# Patient Record
(demographics unavailable — no encounter records)

---

## 2024-11-16 NOTE — BEGINNING OF VISIT
[] :  [[employer] ] : provided by [employer]   [Time Spent: ____ minutes] : Total time spent using  services: [unfilled] minutes. The patient's primary language is not English thus required  services. [Mother] : mother [Interpreters_IDNumber] : [id number] [Interpreters_FullName] : Hansa  [TWNoteComboBox1] : Maldivian

## 2024-11-16 NOTE — HISTORY OF PRESENT ILLNESS
[de-identified] : fevers, cough  [FreeTextEntry6] :  21 month old girl presents with cough x 1 week. Few episodes of post tussive emesis. Cough is worsening. No nebulizer treatments given. Fevers x 1 day managed with tylenol.  Drinking her formula but is not eating.

## 2024-11-16 NOTE — DISCUSSION/SUMMARY
[FreeTextEntry1] :  21 month old girl presents with cough and fever.  Well appearing, lungs clear, no signs of bacterial infection.  Likely viral URI.  Given her history will obtain RVP to r/o mycoplasma infection. If positive will start treatment.  Recommend supportive care. Antipyretics. Increase fluid intake, teaspoon of honey before sleep, humidifier in bedroom, elevated head during sleep, steam from shower, and nasal saline followed by nasal suction. Return precautions discussed.

## 2024-11-16 NOTE — BEGINNING OF VISIT
[] :  [[employer] ] : provided by [employer]   [Time Spent: ____ minutes] : Total time spent using  services: [unfilled] minutes. The patient's primary language is not English thus required  services. [Mother] : mother [Interpreters_IDNumber] : [id number] [Interpreters_FullName] : Hansa  [TWNoteComboBox1] : Ugandan

## 2024-11-16 NOTE — PHYSICAL EXAM
[Playful] : playful [Wheezing] : no wheezing [Rales] : no rales [Rhonchi] : no rhonchi [NL] : moves all extremities x4, warm, well perfused x4

## 2024-11-16 NOTE — HISTORY OF PRESENT ILLNESS
[de-identified] : fevers, cough  [FreeTextEntry6] :  21 month old girl presents with cough x 1 week. Few episodes of post tussive emesis. Cough is worsening. No nebulizer treatments given. Fevers x 1 day managed with tylenol.  Drinking her formula but is not eating.

## 2024-11-18 NOTE — PHYSICAL EXAM
[TextEntry] : General: Well-developed, well-nourished, no acute distress, well-appearing, active and playful. Eyes: Extra-ocular movements intact, conjunctiva clear. Head: normocephalic, atraumatic. Ear: Right ear: Normal external ear, non-erythematous tympanic membrane Left ear: Normal external ear, non-erythematous tympanic membrane Nose: Right nose: Nasal cavity is patent, normal nasal mucosa, turbinates are not enlarged Left nose: Nasal cavity is patent, Normal nasal mucosa, turbinates are not enlarged Throat: Oral mucous membranes moist and pink, no oral lesions, normal dentition and gingiva, tonsils 1+ BL, Mallampati class IV. Neck: Supple, trachea midline, no masses. Cardiovascular: Regular rate and rhythm, no murmurs appreciated; capillary refill < 2 second; 2+ radial pulses bilaterally; no edema. Respiratory: No deformities of the chest, symmetric chest rise, breathing non-labored, no retractions, no nasal flaring, no tracheal tug, lungs clear to auscultation BL, good aeration BL through all lung fields, no crackles, no wheezes, no upper airway transmitted sounds. GI: Abdomen soft, nontender, nondistended, normal bowel sounds, no masses, no organomegaly. Lymph: No cervical lymphadenopathy appreciated. Musculoskeletal: Normal muscle tone and strength. Extremities: Warm, well-perfused, no digital clubbing or cyanosis. Skin: Warm, dry, no rashes. Neurology: Awake, alert, and interactive, normal affect, developmentally appropriate.

## 2024-11-18 NOTE — REASON FOR VISIT
[TextEntry] :  Araceli Garcia is 21 months old female with history of prematurity (25 weeks gestation), developmental delay, and retinopathy of prematurity. From pulmonary and sleep perspective, she has history of BPD/chronic lung disease, chronic cough, recurrent wheezing and shortness of breath with the physical activity. The patient is here for an initial evaluation. The history was obtained from the mother via  [id number].

## 2024-11-18 NOTE — HISTORY OF PRESENT ILLNESS
[FreeTextEntry1] :  Araceli has history of chronic cough since 2024 when she was hospitalized with viral bronchiolitis. The cough is dry, is present both during day and night however more pronounced at night and in the early morning. The cough is aggravated with the physical activity and improved with albuterol. The patient also has history of recurrent wheezing associated with viral URI and occasionally with the physical activity. There is also history of out of proportion shortness of breath associated with physical activity.   She has had 2 hospitalizations so far for respiratory related issues. Both hospitalizations were in [facility]. The 1st hospitalization was in 2024. She was hospitalized for 2 weeks. She was diagnosed with HMNV and pneumonia, and she needed intubation and mechanical ventilation. She was also treated with steroids. She was discharged home on as needed albuterol. The 2nd hospitalization was in 2024. She was hospitalized for 10 days. She was HFNC for a week. She was treated with 5 day course of oral steroids. She was discharged home on albuterol as needed.  Other than these hospitalizations, she did not have any urgent care or ER visits. She was only treated with systemic steroids twice in her life during the above hospitalizations. Her current medicines include albuterol as needed. She has never been treated with inhaled steroids. The last albuterol use was a week ago.   There is no history of feeding difficulties including cough or choking with feeding. There is no history of noisy breathing. There is no history of recurrent pneumonias.  ALLERGY SYMPTOMS: There is no history of nasal and ocular allergy symptoms. There is no history of itchy or watery eyes, itchy or watery nose, and chronic nasal congestion. The patient does not use any antiallergic and/or nasal steroids.  SLEEP SYMPTOMS: There is no history of loud snoring, respiratory pauses during sleep, restless sleep, sleep onset and maintenance insomnia. The patient did not have a sleep study.  OTHER SYMPTOMS: There is history of history of prematurity (25 weeks gestation), developmental delay, and retinopathy of prematurity. The patient is feeding, growing and developing well. There is no history of reflux/GERD. There is no history of, FTT, developmental delay, seizure or neurological disorder.  NEONATOLOGY HISTORY: The patient was born at 25 weeks via . The patient's birth weight was 1 lb 4 oz. The pregnancy was complicated by  labor and PROM. The patient was in the NICU for about 4 months. She needs respiratory support initially CPAP followed by the HFNC and then regular NC. She was weaned to RA on 2024 and was discharged home on RA. She was treated with caffeine for apnea of prematurity. She was given one dose of surfactant.   PAST MEDICAL & SURGICAL HISTROY: There is no history of adenotonsillectomy or any other surgery.  FAMILY HISTORY: There is no family history of asthma, eczema and severe allergies. There is no family history of СВЕТЛАНА, RLS and narcolepsy.  SOCIAL HISTORY: The patient lives with the parents and 2 siblings. There is no smoke or pet exposure at home.

## 2024-11-18 NOTE — REVIEW OF SYSTEMS
[TextEntry] :  14-point review of system is negative except mentioned in history of present illness

## 2024-11-18 NOTE — ASSESSMENT
[FreeTextEntry1] : Araceli Garcia is 21 months old female with history of prematurity (25 weeks gestation), developmental delay, and retinopathy of prematurity. From pulmonary and sleep perspective, she has history of BPD/chronic lung disease, chronic cough, recurrent wheezing and shortness of breath with the physical activity.   Araceli's symptoms are consistent with reactive airway disease due to underlying BPD and recurrent viral bronchiolitis. In addition, she may also has underlying airway malacia which could worsen her symptoms during viral respiratory illnesses. Given the severity of her respiratory illnesses, she may benefit from a maintenance therapy.   From sleep perspective, she sleeps well throughout the night. There is no history of snoring, restless sleep, insomnia or parasomnia.     Plan: 1. Start Pulmicort 0.25 mg twice daily 2. Continue albuterol 2.5 mg every 4 hours as needed for cough, wheezing and/or shortness of breath.  3. I also talked to the mother regarding an airway evaluation for underlying anatomical airway abnormalities and we will plan the procedure during spring. The mother understands and agrees with the plan.  4. I will see her in 3 months, sooner if needed.

## 2024-11-18 NOTE — END OF VISIT
[FreeTextEntry3] : All information documented by staff members, review of systems, past medical history, family history, social history, surgical history, medications, allergies, immunizations and vital signs were reviewed. I obtained the history and examined the patient. I reviewed the chart for pertinent history, lab results, radiological images and procedures. The assessment and plan was discussed with the family.  Code selected for this visit is based on time. The total time spent performing E/M services today is 45 minutes. Time includes preparing to see patient, reviewing diagnostic studies and records, direct face-to-face visit, completing orders, medication reconciliation, prescription management, and care coordination. Time spent documenting clinical information in the record is also included. Time reported does not include any separately reported service or time.

## 2025-02-03 NOTE — PHYSICAL EXAM
[TextEntry] : General: Well-developed, well-nourished, no acute distress, well-appearing, active and playful. Eyes: Extra-ocular movements intact, conjunctiva clear. Head: normocephalic, atraumatic. Ear: Right ear: Normal external ear, non-erythematous tympanic membrane Left ear: Normal external ear, non-erythematous tympanic membrane Nose: Right nose: Nasal cavity is patent, normal nasal mucosa, turbinates are not enlarged Left nose: Nasal cavity is patent, Normal nasal mucosa, turbinates are not enlarged Throat: Oral mucous membranes moist and pink, no oral lesions, normal dentition and gingiva, tonsils 1+ BL. Neck: Supple, trachea midline, no masses. Cardiovascular: Regular rate and rhythm, no murmurs appreciated; capillary refill < 2 second; 2+ radial pulses bilaterally; no edema. Respiratory: No deformities of the chest, symmetric chest rise, breathing non-labored, no retractions, no nasal flaring, no tracheal tug, lungs clear to auscultation BL, good aeration BL through all lung fields, no crackles, no wheezes, no upper airway transmitted sounds. GI: Abdomen soft, nontender, nondistended, normal bowel sounds, no masses, no organomegaly. Lymph: No cervical lymphadenopathy appreciated. Musculoskeletal: Normal muscle tone and strength. Extremities: Warm, well-perfused, no digital clubbing or cyanosis. Skin: Warm, dry, no rashes. Neurology: Awake, alert, and interactive, normal affect, developmentally appropriate.

## 2025-02-03 NOTE — HISTORY OF PRESENT ILLNESS
[FreeTextEntry1] : From pulmonary perspective, she has been doing much better since last visit. She was started on inhaled steroids on the last visit, and she has been taking it regularly. There is no history of interval PCP office, urgent care or ER visits or hospitalizations. She has required albuterol a couple of times since last visit.   From sleep perspective, she sleeps well throughout the night. There is no history of snoring, restless sleep, insomnia or parasomnia.  RESPIRATORY HISTORY: Araceli has history of chronic cough and recurrent wheezing since 2024. She has had 2 hospitalizations so far for respiratory related issues. Both hospitalizations were in [facility]. The 1st hospitalization was in 2024. She was hospitalized for 2 weeks. She was diagnosed with HMNV and pneumonia, and she needed intubation and mechanical ventilation. She was also treated with steroids. She was discharged home on as needed albuterol. The 2nd hospitalization was in 2024. She was hospitalized for 10 days. She was HFNC for a week. She was treated with 5 day course of oral steroids. She was discharged home on albuterol as needed. Other than these hospitalizations, she did not have any urgent care or ER visits. She was only treated with systemic steroids twice in her life during the above hospitalizations. There is no history of feeding difficulties including cough or choking with feeding. There is no history of noisy breathing. There is no history of recurrent pneumonias.  ALLERGY SYMPTOMS: There is no history of nasal and ocular allergy symptoms. There is no history of itchy or watery eyes, itchy or watery nose, and chronic nasal congestion. The patient does not use any antiallergic and/or nasal steroids.  SLEEP SYMPTOMS: There is no history of loud snoring, respiratory pauses during sleep, restless sleep, sleep onset and maintenance insomnia. The patient did not have a sleep study.  OTHER SYMPTOMS: There is history of history of prematurity (25 weeks gestation), developmental delay, and retinopathy of prematurity. The patient is feeding, growing and developing well. There is no history of reflux/GERD. There is no history of, FTT, developmental delay, seizure or neurological disorder.  NEONATOLOGY HISTORY: The patient was born at 25 weeks via . The patient's birth weight was 1 lb 4 oz. The pregnancy was complicated by  labor and PROM. The patient was in the NICU for about 4 months. She needs respiratory support initially CPAP followed by the HFNC and then regular NC. She was weaned to RA on 2024 and was discharged home on RA. She was treated with caffeine for apnea of prematurity. She was given one dose of surfactant.  PAST MEDICAL & SURGICAL HISTROY: There is no history of adenotonsillectomy or any other surgery.  FAMILY HISTORY: There is no family history of asthma, eczema and severe allergies. There is no family history of СВЕТЛАНА, RLS and narcolepsy.  SOCIAL HISTORY: The patient lives with the parents and 2 siblings. There is no smoke or pet exposure at home.

## 2025-02-03 NOTE — END OF VISIT
[FreeTextEntry3] : All information documented by staff members, review of systems, past medical history, family history, social history, surgical history, medications, allergies, immunizations and vital signs were reviewed. I obtained the history and examined the patient. I reviewed the chart for pertinent history, lab results, radiological images and procedures. The assessment and plan was discussed with the family.  Code selected for this visit is based on time. The total time spent performing E/M services today is 30 minutes. Time includes preparing to see patient, reviewing diagnostic studies and records, direct face-to-face visit, completing orders, medication reconciliation, prescription management, and care coordination. Time spent documenting clinical information in the record is also included. Time reported does not include any separately reported service or time.

## 2025-02-03 NOTE — RESULTS/DATA
[TextEntry] : Chest x-ray 02/06/2024: Cardiothymic silhouette is within normal limits. The lungs are mildly hyperinflated with bilateral perihilar reticular opacities. There is no pleural effusion or definite pneumothorax. The visualized osseous structures and upper abdomen are within normal limits.  Chest x-ray 04/07/2023: Single AP view of the chest and abdomen demonstrates enteric tube tip in the region of the stomach. The cardiothymic silhouette is magnified but grossly within normal limits. Prominent bronchovascular markings are seen. There is no evidence of focal consolidation pneumothorax or pleural effusion. Multiple mildly dilated loops of bowel are noted throughout the abdomen. Air is not seen within the rectum on this study.  Chest x-ray 01/31/2023: The heart is normal in size. Bilateral granular opacities, unchanged. No pneumothorax or pleural effusion.  Chest x-ray 01/30/2023: Diffuse bilateral granular opacities. No pleural effusion or pneumothorax.

## 2025-02-03 NOTE — REASON FOR VISIT
[TextEntry] : Araceli Garcia is 2 years old female with history of prematurity (25 weeks gestation), developmental delay, and retinopathy of prematurity. From pulmonary and sleep perspective, she has history of BPD/chronic lung disease, chronic cough, and moderate persistent asthma. The patient is here for a routine follow up and the last clinic visit was on 11/18/2024. The history was obtained from the patient and mother.

## 2025-02-03 NOTE — ASSESSMENT
[FreeTextEntry1] : Araceli Garcia is 2 years old female with history of prematurity (25 weeks gestation), developmental delay, and retinopathy of prematurity. From pulmonary and sleep perspective, she has history of BPD/chronic lung disease, chronic cough, and moderate persistent asthma.  From pulmonary perspective, she has been doing much better since last visit. She was started on inhaled steroids on the last visit, and she has been taking it regularly. There is no history of interval PCP office, urgent care or ER visits or hospitalizations. She has required albuterol a couple of times since last visit.   From sleep perspective, she sleeps well throughout the night. There is no history of snoring, restless sleep, insomnia or parasomnia.  Plan: 1. Continue Pulmicort 0.25 mg twice daily 2. Continue albuterol 2.5 mg every 4 hours as needed for cough, wheezing and/or shortness of breath. 3. I will see her in 3 months, sooner if needed. If she is doing well at that time, I will step down her Pulmicort.

## 2025-02-07 NOTE — PHYSICAL EXAM
[Alert] : alert [No Acute Distress] : no acute distress [Normocephalic] : normocephalic [Anterior [place] Closed] : anterior fontanelle closed [Red Reflex Bilateral] : red reflex bilateral [PERRL] : PERRL [Normally Placed Ears] : normally placed ears [Auricles Well Formed] : auricles well formed [Clear Tympanic membranes with present light reflex and bony landmarks] : clear tympanic membranes with present light reflex and bony landmarks [No Discharge] : no discharge [Nares Patent] : nares patent [Palate Intact] : palate intact [Uvula Midline] : uvula midline [Tooth Eruption] : tooth eruption  [Supple, full passive range of motion] : supple, full passive range of motion [No Palpable Masses] : no palpable masses [Symmetric Chest Rise] : symmetric chest rise [Clear to Auscultation Bilaterally] : clear to auscultation bilaterally [Regular Rate and Rhythm] : regular rate and rhythm [S1, S2 present] : S1, S2 present [No Murmurs] : no murmurs [+2 Femoral Pulses] : +2 femoral pulses [Soft] : soft [NonTender] : non tender [Non Distended] : non distended [Normoactive Bowel Sounds] : normoactive bowel sounds [No Hepatomegaly] : no hepatomegaly [No Splenomegaly] : no splenomegaly [Juan 1] : Juan 1 [Normal Vaginal Introitus] : normal vaginal introitus [Patent] : patent [Normally Placed] : normally placed [No Abnormal Lymph Nodes Palpated] : no abnormal lymph nodes palpated [No Clavicular Crepitus] : no clavicular crepitus [Symmetric Buttocks Creases] : symmetric buttocks creases [No Spinal Dimple] : no spinal dimple [NoTuft of Hair] : no tuft of hair [Cranial Nerves Grossly Intact] : cranial nerves grossly intact [No Rash or Lesions] : no rash or lesions [FreeTextEntry6] : possible mild clitoromegaly

## 2025-02-07 NOTE — HISTORY OF PRESENT ILLNESS
[Mother] : mother [Cow's milk (Ounces per day ___)] : consumes [unfilled] oz of Cow's milk per day [Fruit] : fruit [Vegetables] : vegetables [Meat] : meat [Cereal] : cereal [Eggs] : eggs [Baby food] : baby food [Finger Foods] : finger foods [Table food] : table food [Normal] : Normal [Sippy cup use] : Sippy cup use [Brushing teeth] : Brushing teeth [Vitamin] : Primary Fluoride Source: Vitamin [Playtime 60 min a day] : Playtime 60 min a day [No] : Not at  exposure [Car seat in back seat] : Car seat in back seat [Carbon Monoxide Detectors] : Carbon monoxide detectors [At risk for exposure to TB] : Not at risk for exposure to Tuberculosis [Up to date] : Up to date [FreeTextEntry7] : 2 year old well visit Saw pulmonology, now started on budesonide BID with albuterol PRN. Per mom, Araceli is needing the albuterol only with a cold. No nighttime cough or wheezing. No ED visits since starting medication.   [de-identified] : None

## 2025-02-07 NOTE — DISCUSSION/SUMMARY
[Assessment of Language Development] : assessment of language development [Temperament and Behavior] : temperament and behavior [Toilet Training] : toilet training [TV Viewing] : tv viewing [Safety] : safety [] : The components of the vaccine(s) to be administered today are listed in the plan of care. The disease(s) for which the vaccine(s) are intended to prevent and the risks have been discussed with the caretaker.  The risks are also included in the appropriate vaccination information statements which have been provided to the patient's caregiver.  The caregiver has given consent to vaccinate. [FreeTextEntry1] :  - Growing appropriately - Speech delay - mom unsure how many words kristie is speaking. Discussed monitoring and keeping a list over the next 3 mo and then returning for development check. Refer to EI if warranted  - Moderate persistent asthma - Taking budesonide BID with albuterol PRN. Well controlled at this time. Following with pulmonology - Hgb and lead ordered - Vaccines: hepA and flu  - Lead screening negative - Vision normal - Referred to dentist  - MVIF prescribed  - Well visit in 6 mo

## 2025-02-07 NOTE — DEVELOPMENTAL MILESTONES
[Plays alongside other children] : plays alongside other children [Takes off some clothing] : takes off some clothing [Scoops well with spoon] : scoops well with spoon [Uses 50 words] : does not use 50 words [Combine 2 words into phrase or] : does not combine 2 words into phrase or sentences [Jumps off ground with 2 feet] : jumps off ground with 2 feet [Runs with coordination] : runs with coordination [Turns book pages] : turns book pages [Uses hands to turn objects] : uses hands to turn objects

## 2025-02-07 NOTE — HISTORY OF PRESENT ILLNESS
[Mother] : mother [Cow's milk (Ounces per day ___)] : consumes [unfilled] oz of Cow's milk per day [Fruit] : fruit [Vegetables] : vegetables [Meat] : meat [Cereal] : cereal [Eggs] : eggs [Baby food] : baby food [Finger Foods] : finger foods [Table food] : table food [Normal] : Normal [Sippy cup use] : Sippy cup use [Brushing teeth] : Brushing teeth [Vitamin] : Primary Fluoride Source: Vitamin [Playtime 60 min a day] : Playtime 60 min a day [No] : Not at  exposure [Car seat in back seat] : Car seat in back seat [Carbon Monoxide Detectors] : Carbon monoxide detectors [At risk for exposure to TB] : Not at risk for exposure to Tuberculosis [Up to date] : Up to date [FreeTextEntry7] : 2 year old well visit Saw pulmonology, now started on budesonide BID with albuterol PRN. Per mom, Araceli is needing the albuterol only with a cold. No nighttime cough or wheezing. No ED visits since starting medication.   [de-identified] : None

## 2025-02-07 NOTE — PHYSICAL EXAM
[Alert] : alert [No Acute Distress] : no acute distress [Normocephalic] : normocephalic [Anterior [place] Closed] : anterior fontanelle closed [Red Reflex Bilateral] : red reflex bilateral [PERRL] : PERRL [Normally Placed Ears] : normally placed ears [Auricles Well Formed] : auricles well formed [Clear Tympanic membranes with present light reflex and bony landmarks] : clear tympanic membranes with present light reflex and bony landmarks [No Discharge] : no discharge [Nares Patent] : nares patent [Palate Intact] : palate intact [Uvula Midline] : uvula midline [Tooth Eruption] : tooth eruption  [Supple, full passive range of motion] : supple, full passive range of motion [No Palpable Masses] : no palpable masses [Symmetric Chest Rise] : symmetric chest rise [Clear to Auscultation Bilaterally] : clear to auscultation bilaterally [Regular Rate and Rhythm] : regular rate and rhythm [S1, S2 present] : S1, S2 present [No Murmurs] : no murmurs [+2 Femoral Pulses] : +2 femoral pulses [Soft] : soft [NonTender] : non tender [Non Distended] : non distended [Normoactive Bowel Sounds] : normoactive bowel sounds [No Hepatomegaly] : no hepatomegaly [No Splenomegaly] : no splenomegaly [Juan 1] : Juan 1 [Normal Vaginal Introitus] : normal vaginal introitus [Patent] : patent [Normally Placed] : normally placed [No Abnormal Lymph Nodes Palpated] : no abnormal lymph nodes palpated [No Clavicular Crepitus] : no clavicular crepitus [Symmetric Buttocks Creases] : symmetric buttocks creases [No Spinal Dimple] : no spinal dimple [NoTuft of Hair] : no tuft of hair [Cranial Nerves Grossly Intact] : cranial nerves grossly intact [No Rash or Lesions] : no rash or lesions [FreeTextEntry6] : possible mild clitoromegaly

## 2025-05-05 NOTE — REASON FOR VISIT
[TextEntry] : Araceli Garcia is 2 years old female with history of prematurity (25 weeks gestation), developmental delay, and retinopathy of prematurity. From pulmonary and sleep perspective, she has history of BPD/chronic lung disease, and moderate persistent asthma. The patient is here for a routine follow up and the last clinic visit was on 02/03/2025. The history was obtained from the patient and mother.

## 2025-05-05 NOTE — HISTORY OF PRESENT ILLNESS
[FreeTextEntry1] : From pulmonary perspective, she has been doing much better since last visit. She was taking Budesonide until 6 weeks ago. It was gradually weaned and discontinued. Since last visit she has needed albuterol 3 times. She has needed albuterol once since budesonide was discontinued and was 2 weeks ago when she discontinued wheezing after she was playing outside. Of note, albuterol causes tachycardia to 190 and 200.   From sleep perspective, she sleeps well throughout the night. There is no history of snoring, restless sleep, insomnia or parasomnia.  RESPIRATORY HISTORY: Araceli has history of chronic cough and recurrent wheezing since 2024. She has had 2 hospitalizations so far for respiratory related issues. Both hospitalizations were in [facility]. The 1st hospitalization was in 2024. She was hospitalized for 2 weeks. She was diagnosed with HMNV and pneumonia, and she needed intubation and mechanical ventilation. She was also treated with steroids. She was discharged home on as needed albuterol. The 2nd hospitalization was in 2024. She was hospitalized for 10 days. She was HFNC for a week. She was treated with 5 day course of oral steroids. She was discharged home on albuterol as needed. Other than these hospitalizations, she did not have any urgent care or ER visits. She was only treated with systemic steroids twice in her life during the above hospitalizations. There is no history of feeding difficulties including cough or choking with feeding. There is no history of noisy breathing. There is no history of recurrent pneumonias.  ALLERGY SYMPTOMS: There is no history of nasal and ocular allergy symptoms. There is no history of itchy or watery eyes, itchy or watery nose, and chronic nasal congestion. The patient does not use any antiallergic and/or nasal steroids.  SLEEP SYMPTOMS: There is no history of loud snoring, respiratory pauses during sleep, restless sleep, sleep onset and maintenance insomnia. The patient did not have a sleep study.  OTHER SYMPTOMS: There is history of history of prematurity (25 weeks gestation), developmental delay, and retinopathy of prematurity. The patient is feeding, growing and developing well. There is no history of reflux/GERD. There is no history of, FTT, developmental delay, seizure or neurological disorder.  NEONATOLOGY HISTORY: The patient was born at 25 weeks via . The patient's birth weight was 1 lb 4 oz. The pregnancy was complicated by  labor and PROM. The patient was in the NICU for about 4 months. She needs respiratory support initially CPAP followed by the HFNC and then regular NC. She was weaned to RA on 2024 and was discharged home on RA. She was treated with caffeine for apnea of prematurity. She was given one dose of surfactant.  PAST MEDICAL & SURGICAL HISTROY: There is no history of adenotonsillectomy or any other surgery.  FAMILY HISTORY: There is no family history of asthma, eczema and severe allergies. There is no family history of СВЕТЛАНА, RLS and narcolepsy.  SOCIAL HISTORY: The patient lives with the parents and 2 siblings. There is no smoke or pet exposure at home.

## 2025-05-05 NOTE — ASSESSMENT
[FreeTextEntry1] : Araceli Garcia is 2 years old female with history of prematurity (25 weeks gestation), developmental delay, and retinopathy of prematurity. From pulmonary and sleep perspective, she has history of BPD/chronic lung disease, and moderate persistent asthma.  From pulmonary perspective, she has been doing much better since last visit. She was taking Budesonide until 6 weeks ago. It was gradually weaned and discontinued. Since last visit she has needed albuterol 3 times. She has needed albuterol once since budesonide was discontinued and was 2 weeks ago when she discontinued wheezing after she was playing outside. Of note, albuterol causes tachycardia to 190 and 200.   From sleep perspective, she sleeps well throughout the night. There is no history of snoring, restless sleep, insomnia or parasomnia.  Plan: 1. Since she is doing better, do not restart Budesonide.  2. Discontinue albuterol because of significant tachycardia.  3. Start Xoponex every 4 hours as needed for cough, wheezing and/or shortness of breath. 4. I will see her as needed.

## 2025-05-21 NOTE — DISCUSSION/SUMMARY
[FreeTextEntry1] : meeting developmental milestones No signs of AOM or PNA Well appearing over all  Return for new concerns or persistent fevers Well visit at 30 months of age

## 2025-05-21 NOTE — HISTORY OF PRESENT ILLNESS
[de-identified] : fever follow up  [FreeTextEntry6] : here for recheck development - speech and fever per mom Araceli is now speaking many more words >30 words and she is putting 2 words together in Pashto  She is walking around, is able to go up the stairs Is pointing and responding to name  She follows tasks  She had a fever for a few days last week but now fever has resolved she had a slight cough that is also resolved

## 2025-05-21 NOTE — HISTORY OF PRESENT ILLNESS
[de-identified] : fever follow up  [FreeTextEntry6] : here for recheck development - speech and fever per mom Araceli is now speaking many more words >30 words and she is putting 2 words together in Azeri  She is walking around, is able to go up the stairs Is pointing and responding to name  She follows tasks  She had a fever for a few days last week but now fever has resolved she had a slight cough that is also resolved

## 2025-05-27 NOTE — HISTORY OF PRESENT ILLNESS
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

## 2025-05-27 NOTE — CONSULT LETTER
[Dear  ___] : Dear  [unfilled], [Consult Letter:] : I had the pleasure of evaluating your patient, [unfilled]. [Please see my note below.] : Please see my note below. [Consult Closing:] : Thank you very much for allowing me to participate in the care of this patient.  If you have any questions, please do not hesitate to contact me. [Sincerely,] : Sincerely, [FreeTextEntry3] : Nadege Last MD

## 2025-06-02 NOTE — HISTORY OF PRESENT ILLNESS
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   Araceli's mother reports that she was last seen by her pediatrician this past May for a routine physical examination but had been referred to endocrinology previously. 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 Her mother denies noting axillary hair or odor, pubic hair, or breast development. She was born at 25 weeks and was in the NICU for 4 months, only needed nasal canula O2, and feeding/growing; she was then hospitalized to the PICU at the age of 1 year for a virus and was intubated at that time.   On review of growth points her growth in height has been overall steady

## 2025-06-02 NOTE — PHYSICAL EXAM
[1] : was Juan stage 1 [Juan Stage ___] : the Juan stage for breast development was [unfilled] [Murmur] : no murmurs [FreeTextEntry2] : clitoral wilcox is enlarged, clitoris ~0.5-1 cm

## 2025-06-02 NOTE — PAST MEDICAL HISTORY
[At ___ Weeks Gestation] : at [unfilled] weeks gestation [Age Appropriate] : age appropriate developmental milestones met [de-identified] : NICU for 4 months mostly feeding and growing, needed nasal canula O2 only.

## 2025-06-20 NOTE — HISTORY OF PRESENT ILLNESS
[Gestational Age: ___] : Gestational Age: [unfilled] [Chronological Age: ___] : Chronological Age: [unfilled] [Corrected Age: ___] : Corrected Age: [unfilled] [Pulmonology: ___] : Pulmonology: [unfilled] [Developmental Pediatrics: ___] : Developmental Pediatrics: [unfilled] [No Feeding Issues] : no feeding issues at this time [Ophthalmology: ___] : Ophthalmology: [unfilled] [Solid Foods] : eating solid foods [Soft] : soft [Bloody] : not bloody [Mucousy] : no mucous [de-identified] : Gestational Age: 25, Chronological Age: 28 months 3 weeks  and Corrected Age: 25 months 1 week She has been well recently   [de-identified] : Developmental History: NRE: 10;  High risk & Developmental follow up.  Patient had been scheduled with Peds Behavior and Development on 23.  The patient canceled and no follow up was made.  Will reach out to make appointment for follow up. She was  evaluated by EI and did not qualify for services last year   [de-identified] : she had a cough last week which is now resolved. 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

## 2025-06-20 NOTE — BIRTH HISTORY
[Birthweight ___ kg] : weight [unfilled] kg [Weight ___ kg] : weight [unfilled] kg [Length ___ cm] : length [unfilled] cm [Head Circumference ___ cm] : head circumference [unfilled] cm [EHM: ___] : EHM: [unfilled] [Formula: ____] : formula: [unfilled]

## 2025-06-20 NOTE — DISCUSSION/SUMMARY
[FreeTextEntry1] :   Pt was seen in our NICU [facility] clinic today by Ange Fitzgerald OTR/L, Dr. Montoya, and myself, Maryann Joshi PT, DPT, PCS, CNT. Pt presents to clinic with her mother, father, and brother present. Pt presents as a happy and alert boy/girl, who was interactive during the evaluation. Parents report this behavior was typical. Assessment was performed in her native language of Cymraes.   PMH: ex 25 weeker  At home, she does not receive any services. She is home with Mom during the day.   Pt was evaluated today in clinic using the Neftali Scales of Infant and Toddler Development, Fourth Edition (BSID-IV).  Ange Fitzgerald and I performed the full assessment in [place]ym[place].   Pt scored very low/low avg/avg/ above avg in the cognitive section. Her raw score was x.  Her scaled score was x. The age equivalent was x months. She scored in the x percentile.   Pt scored very low/low avg/avg/ above avg in the language section. She scored in the x percentile. Receptive communication: raw score was x.  Scaled score was x, with an age equivalent of x months. Expressive communication:  raw score was x.  Scaled score was x. with an age equivalent of x months.   Pt scored very low/low avg/avg/ above avg in the motor section. She scored in the x percentile. Fine motor: raw score was x.  Scaled score was x, with an age equivalent of x months. Gross motor:  raw score was x.  Scaled score was x, with an age equivalent of x months.     At this time, we are recommending _________________.  Parents were educated on activities to perform in the home such as reading books, encouraging fine and gross motor activities, and singing. They reported good understanding of all.

## 2025-06-20 NOTE — DISCUSSION/SUMMARY
[FreeTextEntry1] :   Pt was seen in our NICU [facility] clinic today by Ange Fitzgerald OTR/L, Dr. Montoya, and myself, Maryann Joshi PT, DPT, PCS, CNT. Pt presents to clinic with her mother, father, and brother present. Pt presents as a happy and alert boy/girl, who was interactive during the evaluation. Parents report this behavior was typical. Assessment was performed in her native language of Nigerien.   PMH: ex 25 weeker  At home, she does not receive any services. She is home with Mom during the day.   Pt was evaluated today in clinic using the Neftali Scales of Infant and Toddler Development, Fourth Edition (BSID-IV).  Ange Fitzgerald and I performed the full assessment in Nigerien.   Pt scored very low/low avg/avg/ above avg in the cognitive section. Her raw score was x.  Her scaled score was x. The age equivalent was x months. She scored in the x percentile.   Pt scored very low/low avg/avg/ above avg in the language section. She scored in the x percentile. Receptive communication: raw score was x.  Scaled score was x, with an age equivalent of x months. Expressive communication:  raw score was x.  Scaled score was x. with an age equivalent of x months.   Pt scored very low/low avg/avg/ above avg in the motor section. She scored in the x percentile. Fine motor: raw score was x.  Scaled score was x, with an age equivalent of x months. Gross motor:  raw score was x.  Scaled score was x, with an age equivalent of x months.     At this time, we are recommending _________________.  Parents were educated on activities to perform in the home such as reading books, encouraging fine and gross motor activities, and singing. They reported good understanding of all.

## 2025-06-20 NOTE — REASON FOR VISIT
[Follow-Up] : a follow-up visit for [Developmental Delay] : developmental delay [Parents] : parents [Language Line ] : provided by Language [facility]   [Time Spent: ____ minutes] : Total time spent using  services: [unfilled] minutes. The patient's primary language is not English thus required  services. [FreeTextEntry3] : Neftali testing [Interpreters_IDNumber] : [id number] [Interpreters_FullName] : hung [TWNoteComboBox1] : American

## 2025-06-20 NOTE — PHYSICAL EXAM
[Pink] : pink [Well Perfused] : well perfused [No Rashes] : no rashes [No Birth Marks] : no birth marks [Conjunctiva Clear] : conjunctiva clear [PERRL] : pupils were equal, round, reactive to light  [Ears Normal Position and Shape] : normal position and shape of ears [Nares Patent] : nares patent [No Nasal Flaring] : no nasal flaring [Moist and Pink Mucous Membranes] : moist and pink mucous membranes [Palate Intact] : palate intact [No Torticollis] : no torticollis [No Neck Masses] : no neck masses [Symmetric Expansion] : symmetric chest expansion [No Retractions] : no retractions [Clear to Auscultation] : lungs clear to auscultation  [Normal S1, S2] : normal S1 and S2 [Regular Rhythm] : regular rhythm [No Murmur] : no mumur [Normal Pulses] : normal pulses [Non Distended] : non distended [No Masses] : no masses were palpated [Normal Bowel Sounds] : normal bowel sounds [No Scoliosis] : no scoliosis [Normal Range of Motion] : normal range of motion [Normal Posture] : normal posture [No evidence of Hip Dislocation] : no evidence of hip dislocation [Active and Alert] : active and alert [Normal muscle tone] : normal muscle tone of all extremites [Normal truncal tone] : normal truncal tone [Normal deep tendon reflexes] : normal deep tendon reflexes [No head lag] : no head lag [Laughs] : laughs [Mature Pincer Grasp] : has a mature pincer grasp [de-identified] :  repeats words and phrases  [de-identified] : walks well without support

## 2025-06-20 NOTE — ASSESSMENT
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   Infant was seen today in the [facility] with PT for BSID-IV.   Developmental testing performed at this visit:   Neftali Scales of Infant and Toddler Development, Fourth Edition (BSID-IV)   The BSID-IV is used to assess the motor (fine and gross), language (receptive and expressive), and cognitive development of infants and toddlers. Scores were NOT corrected for prematurity   Cognitive Raw Score = 96, Scaled Score = 5 ( age equivalent 20 months)   Fine Motor Raw Score = 69, Scaled Score = 12 (age equivalent  33 months)   Gross Motor Raw Score = 86 , Scaled Score = 6 ( age equivalent 19 months)  Receptive Language Score =38 , Scaled Score = 3 ( age equivalent 18 months)   Expressive Language Score = 35 , Scaled Score = 4 ( age equivalent 18 months)    Cognitive Sum of Scaled Scores=5, Standard Score= ____75___, % ile rank = 5   Motor Sum of Scaled Scores=18, Standard Score= 94, % ile rank = 34   Language Sum of Scaled Scores=7, Standard Score= 66, % ile rank = 1    Assessment    Infant was seen today in the [facility] with PT for BSID-IV.   Developmental Assessment: Araceli is scoring  very low average for  cognitive skills, extremely low for language skills, and average for motor skills for chronologic age.  These results were reviewed with the parents today.  She has had an assessment by EI in 2024 and did not qualify for services at that time.  The household is primarily Grenadian Speaking and although the assessment today was performed in Grenadian by a Grenadian speaking provider, language differences may have contributed to today's language and cognitive skills results.   Her older sibling has  ASD and receives CPSE services at home.  We recommend that Araceli be re-evaluated by Early Intervention given the dealys in both language and cognitive domains. Her delays in gross motor may also be due to her not understanding the instructions for the task.  Her performance is somewhat scattered in all domains.  We provided the information for [facility] and the mother is amenable to calling for an eval. We also provided a list of activities in Grenadian.      We do recommend that they follow up with the Pediatric Behavior and Development Team in the next 3 months.  We explained the importance of this visit in addition to following with the [facility] to follow-up on the services needed. It is possible that EI may just transition to CPSE given that Araceli is already 28 months old.   Home exercises/activities to support development were discussed including the importance of reading to your child.  Nutrition:  Araceli is growing well.  She  is at the ___48___ % for weight, _____26_____% for length. She eats well per mother. They should continue with their current feeding plan.  Appropriate developmental support for feeding was discussed with the family today.   Medical : Araceli is following with the following medical specialties  Peds Pulmonology , endocrine and  Peds Behavior/Developmental and Ophthalmology  Pulmonary: She has been diagnosed with asthma, and has recently been weaned off budesonide and is also now using Xopenex PRN. Pulm f/u is PRN as well.   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 I provided the location for [facility] to have the blood work done. Endo will f/u results . Optho:  last seen:   approx 1 year ago with the recommendation to f/u in 1 year. mother has not been able to make appt due to language barrier. I will call the office ( Dr Scott in [place]) to facilitate)   Follow Up: Follow up with Peds Ophthalmology. Follow up with Behavior and Development   Follow up at [facility] clinic in 1 year for repeat Neftali testing       Time Based Billing: I have spent  95 minutes of time on the encounter, 35 minutes spent on visit with coordination of care, review of patient's chart on the day of the visit, examining the patient and counseling the family.  An additional 60 minutes spent on standardized and structured developmental screening in conjunction with therapy services (scoring, interpretation, report-writing, and review with the family).

## 2025-06-20 NOTE — PHYSICAL EXAM
[Pink] : pink [Well Perfused] : well perfused [No Rashes] : no rashes [No Birth Marks] : no birth marks [Conjunctiva Clear] : conjunctiva clear [PERRL] : pupils were equal, round, reactive to light  [Ears Normal Position and Shape] : normal position and shape of ears [Nares Patent] : nares patent [No Nasal Flaring] : no nasal flaring [Moist and Pink Mucous Membranes] : moist and pink mucous membranes [Palate Intact] : palate intact [No Torticollis] : no torticollis [No Neck Masses] : no neck masses [Symmetric Expansion] : symmetric chest expansion [No Retractions] : no retractions [Clear to Auscultation] : lungs clear to auscultation  [Normal S1, S2] : normal S1 and S2 [Regular Rhythm] : regular rhythm [No Murmur] : no mumur [Normal Pulses] : normal pulses [Non Distended] : non distended [No Masses] : no masses were palpated [Normal Bowel Sounds] : normal bowel sounds [No Scoliosis] : no scoliosis [Normal Range of Motion] : normal range of motion [Normal Posture] : normal posture [No evidence of Hip Dislocation] : no evidence of hip dislocation [Active and Alert] : active and alert [Normal muscle tone] : normal muscle tone of all extremites [Normal truncal tone] : normal truncal tone [Normal deep tendon reflexes] : normal deep tendon reflexes [No head lag] : no head lag [Laughs] : laughs [Mature Pincer Grasp] : has a mature pincer grasp [de-identified] :  repeats words and phrases  [de-identified] : walks well without support

## 2025-06-20 NOTE — REASON FOR VISIT
[Follow-Up] : a follow-up visit for [Developmental Delay] : developmental delay [Parents] : parents [Language Line ] : provided by [facility]   [Time Spent: ____ minutes] : Total time spent using  services: [unfilled] minutes. The patient's primary language is not English thus required  services. [FreeTextEntry3] : Neftali testing [Interpreters_IDNumber] : [id number] [Interpreters_FullName] : hung [TWNoteComboBox1] : [place]

## 2025-06-20 NOTE — HISTORY OF PRESENT ILLNESS
[Gestational Age: ___] : Gestational Age: [unfilled] [Chronological Age: ___] : Chronological Age: [unfilled] [Corrected Age: ___] : Corrected Age: [unfilled] [Pulmonology: ___] : Pulmonology: [unfilled] [Developmental Pediatrics: ___] : Developmental Pediatrics: [unfilled] [No Feeding Issues] : no feeding issues at this time [Ophthalmology: ___] : Ophthalmology: [unfilled] [Solid Foods] : eating solid foods [Soft] : soft [Bloody] : not bloody [Mucousy] : no mucous [de-identified] : Gestational Age: 25, Chronological Age: 28 months 3 weeks  and Corrected Age: 25 months 1 week She has been well recently   [de-identified] : Developmental History: NRE: 10;  High risk & Developmental follow up.  Patient had been scheduled with Peds Behavior and Development on 23.  The patient canceled and no follow up was made.  Will reach out to make appointment for follow up. She was  evaluated by EI and did not qualify for services last year   [de-identified] : she had a cough last week which is now resolved. 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

## 2025-06-20 NOTE — ASSESSMENT
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   Infant was seen today in the [facility] with PT for BSID-IV.   Developmental testing performed at this visit:   Neftali Scales of Infant and Toddler Development, Fourth Edition (BSID-IV)   The BSID-IV is used to assess the motor (fine and gross), language (receptive and expressive), and cognitive development of infants and toddlers. Scores were NOT corrected for prematurity   Cognitive Raw Score = 96, Scaled Score = 5 ( age equivalent 20 months)   Fine Motor Raw Score = 69, Scaled Score = 12 (age equivalent  33 months)   Gross Motor Raw Score = 86 , Scaled Score = 6 ( age equivalent 19 months)  Receptive Language Score =38 , Scaled Score = 3 ( age equivalent 18 months)   Expressive Language Score = 35 , Scaled Score = 4 ( age equivalent 18 months)    Cognitive Sum of Scaled Scores=5, Standard Score= ____75___, % ile rank = 5   Motor Sum of Scaled Scores=18, Standard Score= 94, % ile rank = 34   Language Sum of Scaled Scores=7, Standard Score= 66, % ile rank = 1    Assessment    Infant was seen today in the [facility] with PT for BSID-IV.   Developmental Assessment: Araceli is scoring  very low average for  cognitive skills, extremely low for language skills, and average for motor skills for chronologic age.  These results were reviewed with the parents today.  She has had an assessment by EI in 2024 and did not qualify for services at that time.  The household is primarily [place] and although the assessment today was performed in [place] by a [place] speaking provider, language differences may have contributed to today's language and cognitive skills results.   Her older sibling has  ASD and receives CPSE services at home.  We recommend that Araceli be re-evaluated by Early Intervention given the dealys in both language and cognitive domains. Her delays in gross motor may also be due to her not understanding the instructions for the task.  Her performance is somewhat scattered in all domains.  We provided the information for [facility] and the mother is amenable to calling for an eval. We also provided a list of activities in [place].      We do recommend that they follow up with the Pediatric Behavior and Development Team in the next 3 months.  We explained the importance of this visit in addition to following with the [facility] to follow-up on the services needed. It is possible that EI may just transition to CPSE given that Araceli is already 28 months old.   Home exercises/activities to support development were discussed including the importance of reading to your child.  Nutrition:  Araceli is growing well.  She  is at the ___48___ % for weight, _____26_____% for length. She eats well per mother. They should continue with their current feeding plan.  Appropriate developmental support for feeding was discussed with the family today.   Medical : Araceli is following with the following medical specialties  Peds Pulmonology , endocrine and  Peds Behavior/Developmental and Ophthalmology  Pulmonary: She has been diagnosed with asthma, and has recently been weaned off budesonide and is also now using Xopenex PRN. Pulm f/u is PRN as well.   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 I provided the location for [employer] to have the blood work done. Endo will f/u results . Optho:  last seen:   approx 1 year ago with the recommendation to f/u in 1 year. mother has not been able to make appt due to language barrier. I will call the office ( Dr Scott in [place]) to facilitate)   Follow Up: Follow up with Peds Ophthalmology. Follow up with Behavior and Development   Follow up at [facility] in 1 year for repeat Neftali testing       Time Based Billing: I have spent  95 minutes of time on the encounter, 35 minutes spent on visit with coordination of care, review of patient's chart on the day of the visit, examining the patient and counseling the family.  An additional 60 minutes spent on standardized and structured developmental screening in conjunction with therapy services (scoring, interpretation, report-writing, and review with the family).